# Patient Record
Sex: FEMALE | Race: BLACK OR AFRICAN AMERICAN | Employment: FULL TIME | URBAN - METROPOLITAN AREA
[De-identification: names, ages, dates, MRNs, and addresses within clinical notes are randomized per-mention and may not be internally consistent; named-entity substitution may affect disease eponyms.]

---

## 2021-07-30 ENCOUNTER — OFFICE VISIT (OUTPATIENT)
Dept: CARDIOLOGY CLINIC | Age: 56
End: 2021-07-30
Payer: COMMERCIAL

## 2021-07-30 VITALS
HEIGHT: 64 IN | HEART RATE: 79 BPM | DIASTOLIC BLOOD PRESSURE: 68 MMHG | SYSTOLIC BLOOD PRESSURE: 111 MMHG | WEIGHT: 191 LBS | BODY MASS INDEX: 32.61 KG/M2

## 2021-07-30 DIAGNOSIS — E78.5 HYPERLIPIDEMIA, UNSPECIFIED HYPERLIPIDEMIA TYPE: ICD-10-CM

## 2021-07-30 DIAGNOSIS — R06.02 SHORTNESS OF BREATH: ICD-10-CM

## 2021-07-30 DIAGNOSIS — I20.9 ANGINA PECTORIS SYNDROME (HCC): Primary | ICD-10-CM

## 2021-07-30 DIAGNOSIS — E11.9 TYPE 2 DIABETES MELLITUS WITHOUT COMPLICATION, WITHOUT LONG-TERM CURRENT USE OF INSULIN (HCC): ICD-10-CM

## 2021-07-30 DIAGNOSIS — Z82.49 FAMILY HISTORY OF EARLY CAD: ICD-10-CM

## 2021-07-30 PROCEDURE — 99204 OFFICE O/P NEW MOD 45 MIN: CPT | Performed by: INTERNAL MEDICINE

## 2021-07-30 RX ORDER — ATORVASTATIN CALCIUM 20 MG/1
20 TABLET, FILM COATED ORAL DAILY
Qty: 90 TABLET | Refills: 3 | Status: SHIPPED | OUTPATIENT
Start: 2021-07-30 | End: 2021-07-30 | Stop reason: SDUPTHER

## 2021-07-30 RX ORDER — ASPIRIN 81 MG/1
81 TABLET ORAL DAILY
Qty: 100 TABLET | Refills: 3 | Status: SHIPPED | OUTPATIENT
Start: 2021-07-30 | End: 2021-07-30 | Stop reason: SDUPTHER

## 2021-07-30 RX ORDER — ASPIRIN 81 MG/1
81 TABLET ORAL DAILY
Qty: 100 TABLET | Refills: 3 | Status: SHIPPED | OUTPATIENT
Start: 2021-07-30 | End: 2021-08-06

## 2021-07-30 RX ORDER — METOPROLOL SUCCINATE 50 MG/1
50 TABLET, EXTENDED RELEASE ORAL DAILY
Qty: 90 TABLET | Refills: 3 | Status: SHIPPED | OUTPATIENT
Start: 2021-07-30

## 2021-07-30 RX ORDER — METOPROLOL SUCCINATE 50 MG/1
50 TABLET, EXTENDED RELEASE ORAL DAILY
Qty: 90 TABLET | Refills: 3 | Status: SHIPPED | OUTPATIENT
Start: 2021-07-30 | End: 2021-07-30 | Stop reason: SDUPTHER

## 2021-07-30 RX ORDER — SITAGLIPTIN 100 MG/1
TABLET, FILM COATED ORAL
COMMUNITY
Start: 2021-07-28

## 2021-07-30 RX ORDER — GLIMEPIRIDE 2 MG/1
TABLET ORAL
COMMUNITY
Start: 2021-07-26

## 2021-07-30 RX ORDER — PREDNISONE 50 MG/1
50 TABLET ORAL EVERY 8 HOURS
Qty: 4 TABLET | Refills: 0 | Status: SHIPPED | OUTPATIENT
Start: 2021-07-30 | End: 2021-07-30 | Stop reason: SDUPTHER

## 2021-07-30 RX ORDER — ATORVASTATIN CALCIUM 20 MG/1
20 TABLET, FILM COATED ORAL DAILY
Qty: 90 TABLET | Refills: 3 | Status: SHIPPED | OUTPATIENT
Start: 2021-07-30

## 2021-07-30 RX ORDER — PREDNISONE 50 MG/1
50 TABLET ORAL EVERY 8 HOURS
Qty: 4 TABLET | Refills: 0 | Status: SHIPPED | OUTPATIENT
Start: 2021-07-30 | End: 2021-08-06

## 2021-07-30 NOTE — PROGRESS NOTES
HISTORY OF PRESENT ILLNESS  Carmela Yoo is a 50 y.o. female. July 30, 2021  Patient is in today for new patient evaluation. She is here for evaluation of chest pain. For about a month she has been having episode of substernal pain. This is a pressure-like sensation that lasting anywhere from few minutes to a longer time. Become at rest as well as exertion. Last Friday she had similar episode at rest.  She has episode of shortness of breath that are getting worse also. She was in emergency room where initial evaluation was negative for MI  She has been getting short of breath on exertion these are progressively getting worse. Denies any orthopnea PND. Patient had COVID-19 infection in December 2020. Chest x-ray recently showed no acute infiltrate. Review of Systems   Constitutional: Negative for chills and fever. HENT: Negative for nosebleeds. Eyes: Negative for blurred vision and double vision. Respiratory: Positive for shortness of breath. Negative for cough, hemoptysis, sputum production and wheezing. Cardiovascular: Positive for chest pain. Negative for palpitations, orthopnea, claudication, leg swelling and PND. Gastrointestinal: Negative for abdominal pain, heartburn, nausea and vomiting. Musculoskeletal: Negative for myalgias. Skin: Negative for rash. Neurological: Negative for dizziness, weakness and headaches. Endo/Heme/Allergies: Does not bruise/bleed easily.      Family History   Problem Relation Age of Onset    Heart Attack Mother 39    Heart Surgery Father     Heart Attack Brother 39       Past Medical History:   Diagnosis Date    Anal fissure     Anal pain 8/28/2013    Anemia NEC     Diabetes ([facility] [address]     Hypertension     Nausea & vomiting     has in the past but not recently    Other ill-defined conditions(799.89)     edema       Past Surgical History:   Procedure Laterality Date    HX DILATION AND CURETTAGE      for heavy bleeding    HX HYSTEROSCOPY WITH ENDOMETRIAL ABLATION      HX OTHER SURGICAL      diagnostic laparoscopy 1986    HX OTHER SURGICAL  8/29/13    botox for chronic anal fissure    HX TUBAL LIGATION  1992       Social History     Tobacco Use    Smoking status: Never Smoker    Smokeless tobacco: Never Used   Substance Use Topics    Alcohol use: Yes     Comment: occasionally       Allergies   Allergen Reactions    Peanut Anaphylaxis and Swelling     Facial swelling    Shellfish Derived Anaphylaxis and Swelling     Facial swelling    Iodinated Contrast Media Hives and Rash    Macrobid [Nitrofurantoin Monohyd/M-Cryst] Hives and Rash       Prior to Admission medications    Medication Sig Start Date End Date Taking? Authorizing Provider   Januvia 100 mg tablet TAKE 1 TABLET BY MOUTH ONCE DAILY 7/28/21  Yes Provider, Historical   glimepiride (AMARYL) 2 mg tablet TAKE 1 TABLET BY MOUTH ONCE DAILY 7/26/21  Yes Provider, Historical   metoprolol succinate (TOPROL-XL) 50 mg XL tablet Take 1 Tablet by mouth daily. 7/30/21  Yes Violeta Milner MD   aspirin delayed-release 81 mg tablet Take 1 Tablet by mouth daily. 7/30/21  Yes Violeta Milner MD   atorvastatin (LIPITOR) 20 mg tablet Take 1 Tablet by mouth daily. 7/30/21  Yes Violeta Milner MD   predniSONE (DELTASONE) 50 mg tablet Take 1 Tablet by mouth every eight (8) hours. 7/30/21  Yes Violeta Milner MD   metFORMIN (GLUCOPHAGE) 500 mg tablet Take  by mouth two (2) times daily (with meals). Yes Funmilayo Mendez MD         Visit Vitals  /68   Pulse 79   Ht 5' 4\" (1.626 m)   Wt 86.6 kg (191 lb)   BMI 32.79 kg/m²       Physical Exam  Constitutional:       Appearance: She is well-developed. HENT:      Head: Normocephalic and atraumatic. Eyes:      Conjunctiva/sclera: Conjunctivae normal.   Neck:      Thyroid: No thyromegaly. Vascular: No JVD. Trachea: No tracheal deviation. Cardiovascular:      Rate and Rhythm: Normal rate and regular rhythm.       Chest Wall: PMI is not displaced. Pulses: No decreased pulses. Heart sounds: No murmur heard. No gallop. No S3 sounds. Pulmonary:      Effort: No respiratory distress. Breath sounds: No wheezing or rales. Chest:      Chest wall: No tenderness. Abdominal:      Palpations: Abdomen is soft. Tenderness: There is no abdominal tenderness. Musculoskeletal:      Cervical back: Neck supple. Skin:     General: Skin is warm. Neurological:      Mental Status: She is alert and oriented to person, place, and time. Ms. Solomon Fountain has a reminder for a \"due or due soon\" health maintenance. I have asked that she contact her primary care provider for follow-up on this health maintenance. No flowsheet data found. I have personally reviewed patient's records available from hospital and other providers and incorporated findings in patient care. Provider notes, lab, EKG, x-ray    Assessment         ICD-10-CM ICD-9-CM    1. Angina pectoris syndrome (HCC)  N14.9 292.6 METABOLIC PANEL, BASIC      CBC WITH AUTOMATED DIFF      PROTHROMBIN TIME + INR      DE CATH PLACEMENT & NJX CORONARY ART ANGIO IMG S&I      CASE REQUEST CATH LAB    New onset progressive angina class III. Multiple risk factor including premature atherosclerosis in family   2. Shortness of breath  R06.02 786.05     Possible angina equivalent. Class III   3. Type 2 diabetes mellitus without complication, without long-term current use of insulin (HCC)  E11.9 250.00     Continue treatment lab with PCP   4. Hyperlipidemia, unspecified hyperlipidemia type  L50.9 146.8 METABOLIC PANEL, BASIC      CBC WITH AUTOMATED DIFF      PROTHROMBIN TIME + INR      DE CATH PLACEMENT & NJX CORONARY ART ANGIO IMG S&I    I have initiated statin follow-up lab   5. Family history of early CAD  Z80.55 V14.2     Multiple family member with CAD MI in 45s       7/2021  Seen for new onset chest pain suggestive of crescendo angina. Multiple risk factor.   Add beta-blocker aspirin and statin. Will proceed with cardiac cath. Prednisone and Benadryl given for contrast allergy  THE PATIENT UNDERSTANDS THAT ALTHOUGH RARE, SEVERE  UNEXPECTED COMPLICATIONS CAN OCCUR WITH EACH TYPE OF CARDIAC CATH PROCEDURE. THESE RISKS INCLUDE,  BUT ARE NOT LIMITED TO: ALLERGIC REACTION, INFECTION, BLEEDING, BLOOD VESSEL INJURY,   KIDNEY INJURY FROM X-RAY DYE, PUNCTURE OF THE HEART/LUNGS,   EMERGENT OPEN HEART SURGERY, HEART ATTACK, STROKE, CARDIAC  ARREST OR DEATH OR NEED FOR EMERGENCY CARDIAC BYPASS SURGERY       Medications Discontinued During This Encounter   Medication Reason    metoprolol succinate (TOPROL-XL) 50 mg XL tablet REORDER    aspirin delayed-release 81 mg tablet REORDER    atorvastatin (LIPITOR) 20 mg tablet REORDER    predniSONE (DELTASONE) 50 mg tablet REORDER       Orders Placed This Encounter    METABOLIC PANEL, BASIC     Standing Status:   Future     Standing Expiration Date:   8/29/2021    CBC WITH AUTOMATED DIFF     Standing Status:   Future     Standing Expiration Date:   8/29/2021    PROTHROMBIN TIME + INR     Standing Status:   Future     Standing Expiration Date:   8/29/2021    CT CATH PLACEMENT & NJX CORONARY ART ANGIO IMG S&I    DISCONTD: metoprolol succinate (TOPROL-XL) 50 mg XL tablet     Sig: Take 1 Tablet by mouth daily. Dispense:  90 Tablet     Refill:  3    DISCONTD: aspirin delayed-release 81 mg tablet     Sig: Take 1 Tablet by mouth daily. Dispense:  100 Tablet     Refill:  3    DISCONTD: atorvastatin (LIPITOR) 20 mg tablet     Sig: Take 1 Tablet by mouth daily. Dispense:  90 Tablet     Refill:  3    DISCONTD: predniSONE (DELTASONE) 50 mg tablet     Sig: Take 1 Tablet by mouth every eight (8) hours. Dispense:  4 Tablet     Refill:  0    metoprolol succinate (TOPROL-XL) 50 mg XL tablet     Sig: Take 1 Tablet by mouth daily. Dispense:  90 Tablet     Refill:  3    aspirin delayed-release 81 mg tablet     Sig: Take 1 Tablet by mouth daily. Dispense:  100 Tablet     Refill:  3    atorvastatin (LIPITOR) 20 mg tablet     Sig: Take 1 Tablet by mouth daily. Dispense:  90 Tablet     Refill:  3    predniSONE (DELTASONE) 50 mg tablet     Sig: Take 1 Tablet by mouth every eight (8) hours. Dispense:  4 Tablet     Refill:  0       Follow-up and Dispositions    · Return in about 4 weeks (around 8/27/2021).

## 2021-07-30 NOTE — PATIENT INSTRUCTIONS
For heart catheterization and before that  Start all the medication as soon as you get it  Take baby aspirin 4 tablets a day for 3 days and then cut it down to 1 tablet a day  Start prednisone the day before procedure take 3 doses the day before procedure and one dose on the morning of procedure  Take Benadryl 25 mg along with prednisone starting the day before procedure     Coronary Angiogram with Possible Treatment: Before Your Procedure  What is a coronary angiogram?     A coronary angiogram is a test to look at the blood vessels of your heart. These are called the coronary arteries. You may have this test to see if any of these arteries are narrowed or blocked. The test may also be used to measure the pressure in your heart's chambers. A doctor will put a thin, flexible tube into a blood vessel in your upper leg or groin. This tube is called a catheter. In some cases, the doctor may insert it in a blood vessel near your elbow or wrist.  During the test, the doctor moves the catheter through the blood vessel and into your heart. Then the doctor puts a dye into the catheter. This makes your coronary arteries show up on a screen. Your doctor can see if the arteries are blocked or narrowed. If you have a narrowed or blocked artery, the doctor may do an angioplasty or a coronary stent procedure. In an angioplasty, the doctor puts a catheter with a tiny balloon at the tip into the blocked area and inflates it. The balloon presses the fatty buildup (plaque) against the walls of the artery. This makes more room for blood to flow. In most cases, the doctor then puts a stent in the artery. A stent is a small, expandable tube. It presses against the walls of the artery. The stent is left in the artery to keep it open. This helps blood flow. The catheter is removed from your body. Follow-up care is a key part of your treatment and safety.  Be sure to make and go to all appointments, and call your doctor if you are having problems. It's also a good idea to know your test results and keep a list of the medicines you take. How do you prepare for the procedure? Procedures can be stressful. This information will help you understand what you can expect. And it will help you safely prepare for your procedure. Preparing for the procedure    · Be sure you have someone to take you home. Anesthesia and pain medicine will make it unsafe for you to drive or get home on your own.     · Understand exactly what procedure is planned, along with the risks, benefits, and other options. · Tell your doctor ALL the medicines, vitamins, supplements, and herbal remedies you take. Some may increase the risk of problems during your procedure. Your doctor will tell you if you should stop taking any of them before the procedure and how soon to do it.     · If you take aspirin or some other blood thinner, ask your doctor if you should stop taking it before your procedure. Make sure that you understand exactly what your doctor wants you to do. These medicines increase the risk of bleeding.     · Make sure your doctor and the hospital have a copy of your advance directive. If you don't have one, you may want to prepare one. It lets others know your health care wishes. It's a good thing to have before any type of surgery or procedure. What happens on the day of the procedure? · Follow the instructions exactly about when to stop eating and drinking. If you don't, your procedure may be canceled. If your doctor told you to take your medicines on the day of the procedure, take them with only a sip of water.     · Take a bath or shower before you come in for your procedure. Do not apply lotions, perfumes, deodorants, or nail polish.     · Do not shave the procedure site yourself.     · Take off all jewelry and piercings. And take out contact lenses, if you wear them.    At the hospital or surgery center   · Bring a picture ID.     · You will be kept comfortable and safe by your anesthesia provider. You may get medicine that relaxes you or puts you in a light sleep. The area being worked on will be numb.     · After the procedure, pressure will be applied to the area where the catheter was put into your artery. Then you may have a bandage or a compression device on your groin or arm at the catheter insertion site. This will prevent bleeding.     · Nurses will check your heart rate and blood pressure. The nurse also will check the catheter site for bleeding.     · If the catheter was put in your groin, you will need to lie still and keep your leg straight for several hours. The nurse may put a weighted bag on your leg to help you keep it still.     · If the catheter was put in your arm, you may be able to sit up and get out of bed right away. But you will need to keep your arm still for at least one hour.     · You may be able to go home later the same day, or you may need to stay in the hospital overnight.     · You may have a bruise where the catheter was put in your groin or arm. This is normal and will go away. When should you call your doctor? · You have questions or concerns.     · You don't understand how to prepare for your procedure.     · You become ill before the procedure (such as fever, flu, or a cold).     · You need to reschedule or have changed your mind about having the procedure. Where can you learn more? Go to http://www.gray.com/  Enter V630 in the search box to learn more about \"Coronary Angiogram with Possible Treatment: Before Your Procedure. \"  Current as of: August 31, 2020               Content Version: 12.8  © 0623-6229 [employer]. Care instructions adapted under license by [employer] (which disclaims liability or warranty for this information).  If you have questions about a medical condition or this instruction, always ask your healthcare professional. Cherelle Arreola disclaims any warranty or liability for your use of this information. Coronary Angiogram with Possible Treatment: Before Your Procedure  What is a coronary angiogram?     A coronary angiogram is a test to look at the blood vessels of your heart. These are called the coronary arteries. You may have this test to see if any of these arteries are narrowed or blocked. The test may also be used to measure the pressure in your heart's chambers. A doctor will put a thin, flexible tube into a blood vessel in your upper leg or groin. This tube is called a catheter. In some cases, the doctor may insert it in a blood vessel near your elbow or wrist.  During the test, the doctor moves the catheter through the blood vessel and into your heart. Then the doctor puts a dye into the catheter. This makes your coronary arteries show up on a screen. Your doctor can see if the arteries are blocked or narrowed. If you have a narrowed or blocked artery, the doctor may do an angioplasty or a coronary stent procedure. In an angioplasty, the doctor puts a catheter with a tiny balloon at the tip into the blocked area and inflates it. The balloon presses the fatty buildup (plaque) against the walls of the artery. This makes more room for blood to flow. In most cases, the doctor then puts a stent in the artery. A stent is a small, expandable tube. It presses against the walls of the artery. The stent is left in the artery to keep it open. This helps blood flow. The catheter is removed from your body. Follow-up care is a key part of your treatment and safety. Be sure to make and go to all appointments, and call your doctor if you are having problems. It's also a good idea to know your test results and keep a list of the medicines you take. How do you prepare for the procedure? Procedures can be stressful. This information will help you understand what you can expect. And it will help you safely prepare for your procedure.   Preparing for the procedure    · Be sure you have someone to take you home. Anesthesia and pain medicine will make it unsafe for you to drive or get home on your own.     · Understand exactly what procedure is planned, along with the risks, benefits, and other options. · Tell your doctor ALL the medicines, vitamins, supplements, and herbal remedies you take. Some may increase the risk of problems during your procedure. Your doctor will tell you if you should stop taking any of them before the procedure and how soon to do it.     · If you take aspirin or some other blood thinner, ask your doctor if you should stop taking it before your procedure. Make sure that you understand exactly what your doctor wants you to do. These medicines increase the risk of bleeding.     · Make sure your doctor and the hospital have a copy of your advance directive. If you don't have one, you may want to prepare one. It lets others know your health care wishes. It's a good thing to have before any type of surgery or procedure. What happens on the day of the procedure? · Follow the instructions exactly about when to stop eating and drinking. If you don't, your procedure may be canceled. If your doctor told you to take your medicines on the day of the procedure, take them with only a sip of water.     · Take a bath or shower before you come in for your procedure. Do not apply lotions, perfumes, deodorants, or nail polish.     · Do not shave the procedure site yourself.     · Take off all jewelry and piercings. And take out contact lenses, if you wear them. At the hospital or surgery center   · Bring a picture ID.     · You will be kept comfortable and safe by your anesthesia provider. You may get medicine that relaxes you or puts you in a light sleep. The area being worked on will be numb.     · After the procedure, pressure will be applied to the area where the catheter was put into your artery.  Then you may have a bandage or a compression device on your groin or arm at the catheter insertion site. This will prevent bleeding.     · Nurses will check your heart rate and blood pressure. The nurse also will check the catheter site for bleeding.     · If the catheter was put in your groin, you will need to lie still and keep your leg straight for several hours. The nurse may put a weighted bag on your leg to help you keep it still.     · If the catheter was put in your arm, you may be able to sit up and get out of bed right away. But you will need to keep your arm still for at least one hour.     · You may be able to go home later the same day, or you may need to stay in the hospital overnight.     · You may have a bruise where the catheter was put in your groin or arm. This is normal and will go away. When should you call your doctor? · You have questions or concerns.     · You don't understand how to prepare for your procedure.     · You become ill before the procedure (such as fever, flu, or a cold).     · You need to reschedule or have changed your mind about having the procedure. Where can you learn more? Go to http://www.gray.com/  Enter C330 in the search box to learn more about \"Coronary Angiogram with Possible Treatment: Before Your Procedure. \"  Current as of: August 31, 2020               Content Version: 12.8  © 2006-2021 [employer]. Care instructions adapted under license by [employer] (which disclaims liability or warranty for this information). If you have questions about a medical condition or this instruction, always ask your healthcare professional. [place] [address] any warranty or liability for your use of this information. Instructions    Patients Name:  Sae Wagner    1. [facility]             .       2. Please go to [facility] and ubaldo in the outpatient parking lot that is located around to the back of the hospital and enter through the [facility]. Once you enter through the [facility] check in with the  there. The  will either give you directions or assist you in getting to the cath holding area. 3. You are not to eat anything after midnight before the procedure. Please continue to drink fluids up until 12:00.  (water, juice, black coffee, soft drinks). Do not drink milk or milk products or anything with cream. You may take medications(except for diabetes) with a small sip of water before 6am on the day of the procedure. .    4. If you are diabetic, do not take your insulin/sugar pill the morning of the procedure. 5. MEDICATION INSTRUCTIONS:   Please take your morning medications with the following special instructions:      [x]          Please make sure to take your Blood pressure medication : With just enough water to swallow. [x]          Take your Aspirin and/or Plavix. With just enough water to swallow. []          Stop your Coumadin on   and do not resume it until after the procedure.      []          Take Prednisone 60 mg and Benadryl 25 mg by mouth at Bedtime on  and again on at . This is to prevent you from having an allergic reaction to the dye. 6. We encourage families to wait in the waiting room on the first floor while the procedure is being done. The Doctor will come out and talk with you as soon as the procedure is over. 7. There is the possibility that you may spend the night in the hospital, depending on the results of the procedure. This will be determined after the procedure is done. If angioplasty or stent is planned, you will stay at least one day. 8. If you or your family have any questions, please call our office Monday Friday, 9:00 a. m.4:30 p.m.,  At [phone], and ask to speak to one of the nurses.

## 2021-08-02 ENCOUNTER — TELEPHONE (OUTPATIENT)
Dept: CARDIOLOGY CLINIC | Age: 56
End: 2021-08-02

## 2021-08-02 ENCOUNTER — HOSPITAL ENCOUNTER (OUTPATIENT)
Dept: LAB | Age: 56
Discharge: HOME | End: 2021-08-02
Payer: COMMERCIAL

## 2021-08-02 DIAGNOSIS — I20.9 ANGINA PECTORIS SYNDROME (HCC): ICD-10-CM

## 2021-08-02 DIAGNOSIS — E78.5 HYPERLIPIDEMIA, UNSPECIFIED HYPERLIPIDEMIA TYPE: ICD-10-CM

## 2021-08-02 LAB
ANION GAP SERPL CALC-SCNC: 5 MMOL/L (ref 3–18)
BASOPHILS # BLD: 0 K/UL (ref 0–0.1)
BASOPHILS NFR BLD: 0 % (ref 0–2)
BUN SERPL-MCNC: 12 MG/DL (ref 7–18)
BUN/CREAT SERPL: 13 (ref 12–20)
CALCIUM SERPL-MCNC: 9.1 MG/DL (ref 8.5–10.1)
CHLORIDE SERPL-SCNC: 104 MMOL/L (ref 100–111)
CO2 SERPL-SCNC: 28 MMOL/L (ref 21–32)
CREAT SERPL-MCNC: 0.93 MG/DL (ref 0.6–1.3)
DIFFERENTIAL METHOD BLD: NORMAL
EOSINOPHIL # BLD: 0.1 K/UL (ref 0–0.4)
EOSINOPHIL NFR BLD: 1 % (ref 0–5)
ERYTHROCYTE [DISTWIDTH] IN BLOOD BY AUTOMATED COUNT: 12.4 % (ref 11.6–14.5)
GLUCOSE SERPL-MCNC: 375 MG/DL (ref 74–99)
HCT VFR BLD AUTO: 40.7 % (ref 35–45)
HGB BLD-MCNC: 13 G/DL (ref 12–16)
INR PPP: 1 (ref 0.8–1.2)
LYMPHOCYTES # BLD: 1.5 K/UL (ref 0.9–3.6)
LYMPHOCYTES NFR BLD: 29 % (ref 21–52)
MCH RBC QN AUTO: 27.4 PG (ref 24–34)
MCHC RBC AUTO-ENTMCNC: 31.9 G/DL (ref 31–37)
MCV RBC AUTO: 85.7 FL (ref 74–97)
MONOCYTES # BLD: 0.3 K/UL (ref 0.05–1.2)
MONOCYTES NFR BLD: 6 % (ref 3–10)
NEUTS SEG # BLD: 3.4 K/UL (ref 1.8–8)
NEUTS SEG NFR BLD: 64 % (ref 40–73)
PLATELET # BLD AUTO: 228 K/UL (ref 135–420)
PMV BLD AUTO: 11.3 FL (ref 9.2–11.8)
POTASSIUM SERPL-SCNC: 4.1 MMOL/L (ref 3.5–5.5)
PROTHROMBIN TIME: 13.3 SEC (ref 11.5–15.2)
RBC # BLD AUTO: 4.75 M/UL (ref 4.2–5.3)
SODIUM SERPL-SCNC: 137 MMOL/L (ref 136–145)
WBC # BLD AUTO: 5.3 K/UL (ref 4.6–13.2)

## 2021-08-02 PROCEDURE — 85610 PROTHROMBIN TIME: CPT

## 2021-08-02 PROCEDURE — 85025 COMPLETE CBC W/AUTO DIFF WBC: CPT

## 2021-08-02 PROCEDURE — 80048 BASIC METABOLIC PNL TOTAL CA: CPT

## 2021-08-02 PROCEDURE — 36415 COLL VENOUS BLD VENIPUNCTURE: CPT

## 2021-08-02 NOTE — TELEPHONE ENCOUNTER
----- Message from Edgardo Chavez MD sent at 8/2/2021 12:38 PM EDT -----  Glucose level elevated treatment for PCP otherwise unremarkable

## 2021-08-02 NOTE — TELEPHONE ENCOUNTER
Spoke to patient per  regarding lab/test, lab reviewed Glucose level elevated treatment for PCP otherwise unremarkable. Patient states seen PCP and he changed medications. She voices understanding and acceptance of this advice and will call back if any further questions or concerns.

## 2021-08-06 ENCOUNTER — HOSPITAL ENCOUNTER (OUTPATIENT)
Age: 56
Setting detail: OUTPATIENT SURGERY
Discharge: HOME | End: 2021-08-06
Attending: INTERNAL MEDICINE | Admitting: INTERNAL MEDICINE
Payer: COMMERCIAL

## 2021-08-06 VITALS
WEIGHT: 192 LBS | OXYGEN SATURATION: 97 % | BODY MASS INDEX: 32.78 KG/M2 | SYSTOLIC BLOOD PRESSURE: 107 MMHG | DIASTOLIC BLOOD PRESSURE: 56 MMHG | HEART RATE: 72 BPM | RESPIRATION RATE: 23 BRPM | HEIGHT: 64 IN

## 2021-08-06 DIAGNOSIS — I20.8 ANGINA AT REST (HCC): ICD-10-CM

## 2021-08-06 PROCEDURE — 74011000636 HC RX REV CODE- 636: Performed by: INTERNAL MEDICINE

## 2021-08-06 PROCEDURE — 74011250636 HC RX REV CODE- 250/636: Performed by: INTERNAL MEDICINE

## 2021-08-06 PROCEDURE — C1769 GUIDE WIRE: HCPCS | Performed by: INTERNAL MEDICINE

## 2021-08-06 PROCEDURE — C1894 INTRO/SHEATH, NON-LASER: HCPCS | Performed by: INTERNAL MEDICINE

## 2021-08-06 PROCEDURE — 93458 L HRT ARTERY/VENTRICLE ANGIO: CPT | Performed by: INTERNAL MEDICINE

## 2021-08-06 PROCEDURE — 77030016699 HC CATH ANGI DX INFN1 CARD -A: Performed by: INTERNAL MEDICINE

## 2021-08-06 PROCEDURE — 77030013797 HC KT TRNSDUC PRSSR EDWD -A: Performed by: INTERNAL MEDICINE

## 2021-08-06 PROCEDURE — 74011000250 HC RX REV CODE- 250: Performed by: INTERNAL MEDICINE

## 2021-08-06 PROCEDURE — 77030019569 HC BND COMPR RAD TERU -B: Performed by: INTERNAL MEDICINE

## 2021-08-06 PROCEDURE — 99152 MOD SED SAME PHYS/QHP 5/>YRS: CPT | Performed by: INTERNAL MEDICINE

## 2021-08-06 RX ORDER — LIDOCAINE HYDROCHLORIDE 10 MG/ML
INJECTION, SOLUTION EPIDURAL; INFILTRATION; INTRACAUDAL; PERINEURAL AS NEEDED
Status: DISCONTINUED | OUTPATIENT
Start: 2021-08-06 | End: 2021-08-06 | Stop reason: HOSPADM

## 2021-08-06 RX ORDER — SODIUM CHLORIDE 0.9 % (FLUSH) 0.9 %
5-40 SYRINGE (ML) INJECTION AS NEEDED
Status: CANCELLED | OUTPATIENT
Start: 2021-08-06

## 2021-08-06 RX ORDER — SODIUM CHLORIDE 0.9 % (FLUSH) 0.9 %
5-40 SYRINGE (ML) INJECTION EVERY 8 HOURS
Status: CANCELLED | OUTPATIENT
Start: 2021-08-06

## 2021-08-06 RX ORDER — FENTANYL CITRATE 50 UG/ML
INJECTION, SOLUTION INTRAMUSCULAR; INTRAVENOUS AS NEEDED
Status: DISCONTINUED | OUTPATIENT
Start: 2021-08-06 | End: 2021-08-06 | Stop reason: HOSPADM

## 2021-08-06 RX ORDER — HEPARIN SODIUM 1000 [USP'U]/ML
INJECTION, SOLUTION INTRAVENOUS; SUBCUTANEOUS AS NEEDED
Status: DISCONTINUED | OUTPATIENT
Start: 2021-08-06 | End: 2021-08-06 | Stop reason: HOSPADM

## 2021-08-06 RX ORDER — MIDAZOLAM HYDROCHLORIDE 1 MG/ML
INJECTION, SOLUTION INTRAMUSCULAR; INTRAVENOUS AS NEEDED
Status: DISCONTINUED | OUTPATIENT
Start: 2021-08-06 | End: 2021-08-06 | Stop reason: HOSPADM

## 2021-08-06 NOTE — Clinical Note
Sheath #2: Sheath: inserted. Sheath inserted/placed in the right femoral  artery. Upon evaluation of the common femoral artery stick using fluoroscopy, the access site puncture was within the safe zone.

## 2021-08-06 NOTE — Clinical Note
TRANSFER - IN REPORT:     Verbal report received from: Carmine Fernandez RN. Report consisted of patient's Situation, Background, Assessment and   Recommendations(SBAR). Opportunity for questions and clarification was provided. Assessment completed upon patient's arrival to unit and care assumed. Patient transported with a Cardiac Cath Tech / Patient Care Tech.

## 2021-08-06 NOTE — H&P
H&p update    No new symptoms  Risks, benefits and alternatives of LHC/ptca/stent explained to patient.   All questions answered

## 2021-08-06 NOTE — Clinical Note
TRANSFER - OUT REPORT:     Verbal report given to: Shiv Romero. Report consisted of patient's Situation, Background, Assessment and   Recommendations(SBAR). Opportunity for questions and clarification was provided. Patient transported with a Cardiac Cath Tech / Patient Care Tech. Patient transported to: holding area.

## 2021-08-06 NOTE — Clinical Note
Sheath #2: Sheath: removed. Upon evaluation of the common femoral artery stick using fluoroscopy, the access site puncture was within the safe zone.

## 2021-08-06 NOTE — DISCHARGE INSTRUCTIONS
Patient Education        Left Heart Catheterization: About This Test  What is it? Left heart catheterization is a test to check the left side of your heart. Your doctor might look at the shape of your heart, the motion of your heart, or the blood pressure inside the chambers. Why is this test done? This test gives information about how your heart is working. It can:  · Check blood flow and blood pressure in the chambers of the heart. · Check the pumping action of the heart. · Find out if a heart defect is present and how severe it is. · Find out how well the heart valves work. How is the test done? · You will get medicine to help you relax. · A thin tube called a catheter is put into a blood vessel in the groin or the arm. The doctor moves the catheter through the blood vessel into your heart. · You will get a shot to numb the skin where the catheter goes in. · Dye may be injected into your heart. Your doctor can watch on special monitors as the dye moves in your heart. The dye helps your doctor see blood flow in your heart. · If a heart defect is found, cardiac catheterization sometimes is used to correct it during the test.  · You will stay in a room for at least a few hours to make sure the catheter site starts to heal. You may have a bandage or a compression device on your groin or arm to prevent bleeding. · If the catheter was placed in your groin, you may lie in bed for a few hours. If the catheter was put in your arm, you will need to keep your arm still for at least 1 hour. How long does it take? The test will take about 30 minutes. If a problem is found and the doctor treats it, the test can take a few hours longer. What happens after the test?  · You may or may not need to stay in the hospital overnight. You will get more instructions for what to do at home. · Drink plenty of fluids for several hours after the test.  Follow-up care is a key part of your treatment and safety.  Be sure to make and go to all appointments, and call your doctor if you are having problems. It's also a good idea to know your test results and keep a list of the medicines you take. Where can you learn more? Go to http://www.gray.com/  Enter W306 in the search box to learn more about \"Left Heart Catheterization: About This Test.\"  Current as of: August 31, 2020               Content Version: 12.8  © 2006-2021 [employer]. Care instructions adapted under license by [employer] (which disclaims liability or warranty for this information). If you have questions about a medical condition or this instruction, always ask your healthcare professional. [place] [address] any warranty or liability for your use of this information. DISCHARGE SUMMARY from Nurse    PATIENT INSTRUCTIONS:    After general anesthesia or intravenous sedation, for 24 hours or while taking prescription Narcotics:  · Limit your activities  · Do not drive and operate hazardous machinery  · Do not make important personal or business decisions  · Do  not drink alcoholic beverages  · If you have not urinated within 8 hours after discharge, please contact your surgeon on call. Report the following to your surgeon:  · Excessive pain, swelling, redness or odor of or around the surgical area  · Temperature over 100.5  · Nausea and vomiting lasting longer than 4 hours or if unable to take medications  · Any signs of decreased circulation or nerve impairment to extremity: change in color, persistent  numbness, tingling, coldness or increase pain  · Any questions    What to do at Home:  Recommended activity: Activity as tolerated and no driving for today. If you experience any of the following symptoms Pain not being relieved with over the counter medications , please follow up with Dr. Juanita Wilhelm [phone].     *  Please give a list of your current medications to your Primary Care Provider. *  Please update this list whenever your medications are discontinued, doses are      changed, or new medications (including over-the-counter products) are added. *  Please carry medication information at all times in case of emergency situations. These are general instructions for a healthy lifestyle:    No smoking/ No tobacco products/ Avoid exposure to second hand smoke  Surgeon General's Warning:  Quitting smoking now greatly reduces serious risk to your health. Obesity, smoking, and sedentary lifestyle greatly increases your risk for illness    A healthy diet, regular physical exercise & weight monitoring are important for maintaining a healthy lifestyle    You may be retaining fluid if you have a history of heart failure or if you experience any of the following symptoms:  Weight gain of 3 pounds or more overnight or 5 pounds in a week, increased swelling in our hands or feet or shortness of breath while lying flat in bed. Please call your doctor as soon as you notice any of these symptoms; do not wait until your next office visit. The discharge information has been reviewed with the patient. The patient verbalized understanding. Discharge medications reviewed with the patient and appropriate educational materials and side effects teaching were provided.   ___________________________________________________________________________________________________________________________________

## 2021-08-06 NOTE — Clinical Note
Contrast Dose Calculator:   Patient's age: 50.   Patient's sex: Female. Patient weight (kg) = 87. Creatinine level (mg/dL) = 0.93. Creatinine clearance (mL/min): 96.08. Max Contrast dose per Creatinine Cl calculator = 216.18 mL.

## 2021-09-14 ENCOUNTER — OFFICE VISIT (OUTPATIENT)
Dept: CARDIOLOGY CLINIC | Age: 56
End: 2021-09-14
Payer: COMMERCIAL

## 2021-09-14 VITALS
BODY MASS INDEX: 33.29 KG/M2 | WEIGHT: 195 LBS | SYSTOLIC BLOOD PRESSURE: 138 MMHG | HEART RATE: 71 BPM | HEIGHT: 64 IN | DIASTOLIC BLOOD PRESSURE: 83 MMHG

## 2021-09-14 DIAGNOSIS — E11.9 TYPE 2 DIABETES MELLITUS WITHOUT COMPLICATION, WITHOUT LONG-TERM CURRENT USE OF INSULIN (HCC): ICD-10-CM

## 2021-09-14 DIAGNOSIS — E78.5 HYPERLIPIDEMIA, UNSPECIFIED HYPERLIPIDEMIA TYPE: ICD-10-CM

## 2021-09-14 DIAGNOSIS — I20.8 ANGINA AT REST (HCC): Primary | ICD-10-CM

## 2021-09-14 PROCEDURE — 99213 OFFICE O/P EST LOW 20 MIN: CPT | Performed by: INTERNAL MEDICINE

## 2021-09-14 NOTE — PROGRESS NOTES
1. Have you been to the ER, urgent care clinic since your last visit? Hospitalized since your last visit?     no  2. Have you seen or consulted any other health care providers outside of the [address] [place] [address] since your last visit? Include any pap smears or colon screening.       No

## 2021-09-14 NOTE — PROGRESS NOTES
HISTORY OF PRESENT ILLNESS  Isabelle Chavez is a 49 y.o. female. July 30, 2021  Patient is in today for new patient evaluation. She is here for evaluation of chest pain. For about a month she has been having episode of substernal pain. This is a pressure-like sensation that lasting anywhere from few minutes to a longer time. Become at rest as well as exertion. Last Friday she had similar episode at rest.  She has episode of shortness of breath that are getting worse also. She was in emergency room where initial evaluation was negative for MI  She has been getting short of breath on exertion these are progressively getting worse. Denies any orthopnea PND. Patient had COVID-19 infection in December 2020. Chest x-ray recently showed no acute infiltrate. Review of Systems   Constitutional: Negative for chills and fever. HENT: Negative for nosebleeds. Eyes: Negative for blurred vision and double vision. Respiratory: Positive for shortness of breath. Negative for cough, hemoptysis, sputum production and wheezing. Cardiovascular: Positive for chest pain. Negative for palpitations, orthopnea, claudication, leg swelling and PND. Gastrointestinal: Negative for abdominal pain, heartburn, nausea and vomiting. Musculoskeletal: Negative for myalgias. Skin: Negative for rash. Neurological: Negative for dizziness, weakness and headaches. Endo/Heme/Allergies: Does not bruise/bleed easily.      Family History   Problem Relation Age of Onset    Heart Attack Mother 39    Heart Surgery Father     Heart Attack Brother 39       Past Medical History:   Diagnosis Date    Anal fissure     Anal pain 8/28/2013    Anemia NEC     Diabetes ([address]     Hypertension     Nausea & vomiting     has in the past but not recently    Other ill-defined conditions(799.89)     edema       Past Surgical History:   Procedure Laterality Date    HX DILATION AND CURETTAGE      for heavy bleeding    HX HYSTEROSCOPY WITH ENDOMETRIAL ABLATION      HX OTHER SURGICAL      diagnostic laparoscopy 1986    HX OTHER SURGICAL  8/29/13    botox for chronic anal fissure    HX TUBAL LIGATION  1992       Social History     Tobacco Use    Smoking status: Never Smoker    Smokeless tobacco: Never Used   Substance Use Topics    Alcohol use: Yes     Comment: occasionally       Allergies   Allergen Reactions    Peanut Anaphylaxis and Swelling     Facial swelling    Shellfish Derived Anaphylaxis and Swelling     Facial swelling    Trulicity [Dulaglutide] Hives    Iodinated Contrast Media Hives and Rash    Macrobid [Nitrofurantoin Monohyd/M-Cryst] Hives and Rash       Prior to Admission medications    Medication Sig Start Date End Date Taking? Authorizing Provider   Januvia 100 mg tablet TAKE 1 TABLET BY MOUTH ONCE DAILY 7/28/21  Yes Provider, Historical   glimepiride (AMARYL) 2 mg tablet TAKE 1 TABLET BY MOUTH ONCE DAILY 7/26/21  Yes Provider, Historical   metoprolol succinate (TOPROL-XL) 50 mg XL tablet Take 1 Tablet by mouth daily. 7/30/21  Yes Jorge Vasquez MD   atorvastatin (LIPITOR) 20 mg tablet Take 1 Tablet by mouth daily. 7/30/21  Yes Jorge Vasquez MD   metFORMIN (GLUCOPHAGE) 500 mg tablet Take  by mouth two (2) times daily (with meals). Yes Other, MD Funmilayo         Visit Vitals  /83   Pulse 71   Ht 5' 4\" (1.626 m)   Wt 88.5 kg (195 lb)   BMI 33.47 kg/m²       Physical Exam  Constitutional:       Appearance: She is well-developed. HENT:      Head: Normocephalic and atraumatic. Eyes:      Conjunctiva/sclera: Conjunctivae normal.   Neck:      Thyroid: No thyromegaly. Vascular: No JVD. Trachea: No tracheal deviation. Cardiovascular:      Rate and Rhythm: Normal rate and regular rhythm. Chest Wall: PMI is not displaced. Pulses: No decreased pulses. Heart sounds: No murmur heard. No gallop. No S3 sounds. Pulmonary:      Effort: No respiratory distress. Breath sounds:  No wheezing or rales. Chest:      Chest wall: No tenderness. Abdominal:      Palpations: Abdomen is soft. Tenderness: There is no abdominal tenderness. Musculoskeletal:      Cervical back: Neck supple. Skin:     General: Skin is warm. Neurological:      Mental Status: She is alert and oriented to person, place, and time. Ms. Mick Barnes has a reminder for a \"due or due soon\" health maintenance. I have asked that she contact her primary care provider for follow-up on this health maintenance. No flowsheet data found. I have personally reviewed patient's records available from hospital and other providers and incorporated findings in patient care. Provider notes, lab, EKG, x-ray  Complications    Complications documented before study signed (8/9/2021 17:97 AM)     No complications were associated with this study. Documented by Jennifer Key MD - 8/6/2021  8:49 PM      Coronary Findings    Diagnostic  Dominance: Right  Left Main   The vessel was visualized by angiography. The vessel is angiographically normal.   Left Anterior Descending   The vessel was visualized by angiography. The vessel is angiographically normal.   Left Circumflex   The vessel was visualized by angiography. The vessel is angiographically normal.   Right Coronary Artery   The vessel was visualized by angiography. The vessel is angiographically normal.       Assessment         ICD-10-CM ICD-9-CM    1. Angina at rest [facility])  I20.8 413.9     Stable. Likely related to stress. No significant CAD. Medical management   2. Type 2 diabetes mellitus without complication, without long-term current use of insulin ([facility])  E11.9 250.00     Continue current treatment lab with PCP   3. Hyperlipidemia, unspecified hyperlipidemia type  E78.5 272.4     Continue treatment lab with PCP       7/2021  Seen for new onset chest pain suggestive of crescendo angina. Multiple risk factor. Add beta-blocker aspirin and statin.   Will proceed with cardiac cath.  Prednisone and Benadryl given for contrast allergy  9/2021  Angina stable likely from stress no significant CAD. Beta-blocker can be stopped as her pressure at home remains normal    There are no discontinued medications. No orders of the defined types were placed in this encounter. Follow-up and Dispositions    · Return in about 1 year (around 9/14/2022).

## 2024-03-25 ENCOUNTER — TELEPHONE (OUTPATIENT)
Age: 59
End: 2024-03-25

## 2024-03-25 NOTE — TELEPHONE ENCOUNTER
I called and spoke to the pt. The pt was identified using 2 pt identifiers. She was offered an appt with Dr. Adamson on 03/28/24 at 1320. The pt was informed that this would be at the [place] office location on [address] [facility]. She verbalized understanding and is aware of the location. The pt states that this location would be fine and accepted the appt. She was given the physical address to the office. Nothing further needed at this time.

## 2024-03-25 NOTE — TELEPHONE ENCOUNTER
New Patient called and stated that her feet are really cold and her PCP advised her to call and see if she can be seen sooner than 04/18.     I advised her the next available isn't until May but put her on the and she says she will reach back out to her PCP and I advised her that I'll get a message back.    Please advise if she can be seen sooner.     Patient can be reached at [phone].

## 2024-03-27 NOTE — PROGRESS NOTES
[facility]  [address], [address]  [place] [address]  Phone: [phone]  Fax: [phone]     NEW PATIENT  Pt's YOB: 1965    ASSESSMENT   Rosanna was seen today for new patient and lower back pain.    Diagnoses and all orders for this visit:    Lumbar spine pain  -     AMB POC XRAY, SPINE, LUMBOSACRAL; 4+  -     MRI LUMBAR SPINE WO CONTRAST; Future    Lumbar radiculitis  -     MRI LUMBAR SPINE WO CONTRAST; Future  -     gabapentin (NEURONTIN) 300 MG capsule; Taper up to 2 capsules bid for neuropathic symptoms as directed    Muscle spasm  -     MRI LUMBAR SPINE WO CONTRAST; Future  -     diazePAM (VALIUM) 5 MG tablet; Take 1 at night as needed for muscle spasm    Left foot pain  -     MRI LUMBAR SPINE WO CONTRAST; Future         IMPRESSION AND PLAN:  Rosanna Diaz is a 58 y.o. RHD female with history of lumbar pain with radicular symptoms and left foot pain. Patient is taking metformin 500 mg, glimepiride 2 mg, insulin, and atorvastatin 20 mg. She has previously had PT.    Pt was given information on Complex Regional Pain Syndrome  Discussed treatment options with the patient including advanced imaging and medications.   Lumbar 4V x-rays were obtained in clinic  Lumbar MRI was ordered to assess for stenosis, inflammation and impingement  Ms. Diaz has a reminder for a \"due or due soon\" health maintenance. I have asked that she contact her primary care provider, Saul Lucio MD, for follow-up on this health maintenance.   demonstrated consistency with prescribing.   Patient was prescribed Gabapentin 300 mg  tapering up to 2 pills BID  Pending results of MRI, also consideration for CRPS for left foot / leg pain.    Return in about 4 weeks (around 4/25/2024) for Medication follow up, Diagnostic follow up.      HISTORY OF PRESENT ILLNESS:  Rosanna Diaz is a 58 y.o. right hand dominant female with history of lumbar pain. Pt presents

## 2024-03-28 ENCOUNTER — OFFICE VISIT (OUTPATIENT)
Age: 59
End: 2024-03-28
Payer: COMMERCIAL

## 2024-03-28 VITALS
OXYGEN SATURATION: 93 % | BODY MASS INDEX: 27.31 KG/M2 | TEMPERATURE: 98.1 F | WEIGHT: 160 LBS | SYSTOLIC BLOOD PRESSURE: 113 MMHG | HEIGHT: 64 IN | HEART RATE: 84 BPM | DIASTOLIC BLOOD PRESSURE: 79 MMHG

## 2024-03-28 DIAGNOSIS — M54.50 LUMBAR SPINE PAIN: Primary | ICD-10-CM

## 2024-03-28 DIAGNOSIS — M54.16 LUMBAR RADICULITIS: ICD-10-CM

## 2024-03-28 DIAGNOSIS — M62.838 MUSCLE SPASM: ICD-10-CM

## 2024-03-28 DIAGNOSIS — M79.672 LEFT FOOT PAIN: ICD-10-CM

## 2024-03-28 PROCEDURE — 99204 OFFICE O/P NEW MOD 45 MIN: CPT | Performed by: PHYSICAL MEDICINE & REHABILITATION

## 2024-03-28 PROCEDURE — 72110 X-RAY EXAM L-2 SPINE 4/>VWS: CPT | Performed by: PHYSICAL MEDICINE & REHABILITATION

## 2024-03-28 RX ORDER — GLUCOSAMINE HCL 500 MG
TABLET ORAL
COMMUNITY

## 2024-03-28 RX ORDER — GABAPENTIN 300 MG/1
CAPSULE ORAL
Qty: 120 CAPSULE | Refills: 1 | Status: SHIPPED | OUTPATIENT
Start: 2024-03-28 | End: 2024-05-21

## 2024-03-28 RX ORDER — DIAZEPAM 5 MG/1
TABLET ORAL
Qty: 20 TABLET | Refills: 0 | Status: SHIPPED | OUTPATIENT
Start: 2024-03-28 | End: 2024-04-23

## 2024-04-04 ENCOUNTER — TELEPHONE (OUTPATIENT)
Age: 59
End: 2024-04-04

## 2024-04-04 DIAGNOSIS — F40.240 CLAUSTROPHOBIA: Primary | ICD-10-CM

## 2024-04-04 RX ORDER — DIAZEPAM 5 MG/1
TABLET ORAL
Qty: 2 TABLET | Refills: 0 | Status: SHIPPED | OUTPATIENT
Start: 2024-04-04 | End: 2024-04-17

## 2024-04-04 NOTE — TELEPHONE ENCOUNTER
Patient called and said that she is having the Mri of the Lumbar Spine that  ordered on 4/8/24 at [facility].    Patient said that she is Claustrophobic and is wondering if she could get something that will help her through the Mri.    [facility] on [address]   Tel. [phone]    Patient tel. [phone].

## 2024-04-05 NOTE — TELEPHONE ENCOUNTER
Pt identified using 2 identifiers. Informed pt that rx was at pharmacy. Also informed her how to take it and reminded her she will need a .

## 2024-04-08 ENCOUNTER — HOSPITAL ENCOUNTER (OUTPATIENT)
Facility: HOSPITAL | Age: 59
Discharge: HOME | End: 2024-04-11
Attending: PHYSICAL MEDICINE & REHABILITATION
Payer: COMMERCIAL

## 2024-04-08 DIAGNOSIS — M79.672 LEFT FOOT PAIN: ICD-10-CM

## 2024-04-08 DIAGNOSIS — M54.16 LUMBAR RADICULITIS: ICD-10-CM

## 2024-04-08 DIAGNOSIS — M54.50 LUMBAR SPINE PAIN: ICD-10-CM

## 2024-04-08 DIAGNOSIS — M62.838 MUSCLE SPASM: ICD-10-CM

## 2024-04-08 PROCEDURE — 72148 MRI LUMBAR SPINE W/O DYE: CPT

## 2024-05-09 ENCOUNTER — OFFICE VISIT (OUTPATIENT)
Age: 59
End: 2024-05-09
Payer: COMMERCIAL

## 2024-05-09 VITALS
OXYGEN SATURATION: 100 % | HEIGHT: 64 IN | WEIGHT: 155.8 LBS | TEMPERATURE: 98.2 F | BODY MASS INDEX: 26.6 KG/M2 | HEART RATE: 113 BPM

## 2024-05-09 DIAGNOSIS — R20.8 ALLODYNIA: ICD-10-CM

## 2024-05-09 DIAGNOSIS — M79.605 LEFT LEG PAIN: ICD-10-CM

## 2024-05-09 DIAGNOSIS — R09.89 DECREASED PULSE: ICD-10-CM

## 2024-05-09 DIAGNOSIS — M54.16 LUMBAR RADICULITIS: Primary | ICD-10-CM

## 2024-05-09 PROCEDURE — 99214 OFFICE O/P EST MOD 30 MIN: CPT | Performed by: PHYSICAL MEDICINE & REHABILITATION

## 2024-05-09 PROCEDURE — 93922 UPR/L XTREMITY ART 2 LEVELS: CPT | Performed by: PHYSICAL MEDICINE & REHABILITATION

## 2024-05-09 RX ORDER — DULOXETIN HYDROCHLORIDE 30 MG/1
30 CAPSULE, DELAYED RELEASE ORAL DAILY
Qty: 30 CAPSULE | Refills: 1 | Status: SHIPPED | OUTPATIENT
Start: 2024-05-09

## 2024-05-09 RX ORDER — PIOGLITAZONEHYDROCHLORIDE 15 MG/1
15 TABLET ORAL DAILY
COMMUNITY
Start: 2024-03-22

## 2024-05-09 RX ORDER — GABAPENTIN 300 MG/1
CAPSULE ORAL
Qty: 120 CAPSULE | Refills: 2 | Status: SHIPPED | OUTPATIENT
Start: 2024-05-09 | End: 2024-07-02

## 2024-05-09 NOTE — PROGRESS NOTES
lb 12.8 oz)   SpO2 100%   BMI 26.74 kg/m²     Constitutional: Awake, alert, and in no acute distress.  Neurological:1+ symmetrical DTRs in the upper extremities. 1+ symmetrical DTRs in the lower extremities. Sensation to light touch is intact. Negative Herrera's sign bilaterally.  Skin: cool, dry, and intact left foot; decreased pulse;    Musculoskeletal: No pain with extension, axial loading, or forward flexion. No pain with internal or external rotation of her hips. Negative straight leg raise bilaterally. Heel walk, toe walk, and single leg stance not tested due to severe left foot pain.       Biceps  Triceps Deltoids Wrist Ext Wrist Flex Hand Intrin   Right +4/5 +4/5 +4/5 +4/5 +4/5 +4/5   Left +4/5 +4/5 +4/5 +4/5 +4/5 +4/5      Hip Flex  Quads Hamstrings Ankle DF EHL Ankle PF   Right +4/5 +4/5 +4/5 +4/5 +4/5 +4/5   Left +4/5 +4/5 +4/5 +4/5 +4/5 +4/5     IMAGING:  Lumbar MRI from 04/06/24 was personally reviewed with the patient and demonstrated:  NARRATIVE & IMPRESSION:  MRI LUMBAR SPINE WO CONTRAST: 4/8/2024 2:23 PM  CLINICAL INFORMATION: Low back pain, unspecified; Radiculopathy, lumbar region; Other muscle spasm; Pain in left foot.  COMPARISON: None.  TECHNIQUE: Multiplanar MR images of the lumbar spine obtained including T1 and T2-weighted  sequences.     FINDINGS: For discussion purposes, the most superior axial T2-weighted image defines the .  Vertebral body height and alignment: Normal. No evidence of acute fracture.  Bone marrow signal: Normal.  Conus/filum: Normal in appearance and terminates at an appropriate level.   Intervertebral disc height: Normal.  Paraspinal tissues: The paraspinous soft tissues are within normal limits.     Specific segmental anatomy is as follows:  T12-L1: Central canal and neural foramina appear patent.  L1-2: Central canal and neural foramina appear patent.  L2-3: Central canal and neural foramina appear patent.  L3-4: Mild broad-based disc bulge. Central canal and neural

## 2024-06-13 ENCOUNTER — OFFICE VISIT (OUTPATIENT)
Age: 59
End: 2024-06-13

## 2024-06-13 VITALS
HEART RATE: 93 BPM | BODY MASS INDEX: 27.66 KG/M2 | WEIGHT: 162 LBS | HEIGHT: 64 IN | OXYGEN SATURATION: 99 % | TEMPERATURE: 98 F

## 2024-06-13 DIAGNOSIS — R20.8 ALLODYNIA: Primary | ICD-10-CM

## 2024-06-13 DIAGNOSIS — M79.672 LEFT FOOT PAIN: ICD-10-CM

## 2024-06-13 DIAGNOSIS — M54.16 LUMBAR RADICULITIS: ICD-10-CM

## 2024-06-13 DIAGNOSIS — M62.838 MUSCLE SPASM: ICD-10-CM

## 2024-06-13 DIAGNOSIS — M79.605 LEFT LEG PAIN: ICD-10-CM

## 2024-06-13 PROCEDURE — 99213 OFFICE O/P EST LOW 20 MIN: CPT | Performed by: PHYSICAL MEDICINE & REHABILITATION

## 2024-06-13 RX ORDER — GABAPENTIN 300 MG/1
CAPSULE ORAL
Qty: 120 CAPSULE | Refills: 5 | Status: SHIPPED | OUTPATIENT
Start: 2024-06-13 | End: 2024-08-06

## 2024-06-13 RX ORDER — DULOXETIN HYDROCHLORIDE 30 MG/1
30 CAPSULE, DELAYED RELEASE ORAL DAILY
Qty: 30 CAPSULE | Refills: 5 | Status: SHIPPED | OUTPATIENT
Start: 2024-06-13

## 2024-06-13 NOTE — PROGRESS NOTES
[facility]  [address], [address]  [place] [address]  Phone: [phone]  Fax: [phone]    Pt's YOB: 1965    ASSESSMENT   Rosanna was seen today for back pain.    Diagnoses and all orders for this visit:    Allodynia    Lumbar radiculitis  -     DULoxetine (CYMBALTA) 30 MG extended release capsule; Take 1 capsule by mouth daily For neuropathic symptoms  -     gabapentin (NEURONTIN) 300 MG capsule; Take 2 capsules bid for neuropathic symptoms as directed    Left leg pain    Muscle spasm    Left foot pain         IMPRESSION AND PLAN:  Patient is a 58-year-old female with a history of left leg and left foot pain and inciting injury.  She has responded well to the use of gabapentin and duloxetine 30 mg.  She notes that her left foot has improved to the point where she is now able to wear shoe and would like to continue with her medication.    1) Pt was given information on complex regional pain syndrome  2) Pt given refill for Cymbalta 30 mg daily for neuropathic symptoms  3) Pt given refill for gabapentin 300 mg - 2 po bid for neuropathic symptoms  4) Desensitization therapy discussed with pt -- she will begin this at home as her insurance was cancelled   4) Ms. Diaz has a reminder for a \"due or due soon\" health maintenance. I have asked that she contact her primary care provider, Saul Lucio MD, for follow-up on this health maintenance.  5)  demonstrated consistency with prescribing.     Return in about 3 months (around 9/13/2024) for Medication follow up.        HISTORY OF PRESENT ILLNESS:  Rosanna Diaz is a 58 y.o.  female with history of left leg and foot pain and presents to the office today for medication follow up.  Patient notes that she did not get the vascular study that was ordered at her previous office visit as her insurance was canceled through her company.  The company is currently in the process of deciding

## 2024-09-26 ENCOUNTER — OFFICE VISIT (OUTPATIENT)
Age: 59
End: 2024-09-26

## 2024-09-26 VITALS
BODY MASS INDEX: 30.05 KG/M2 | HEART RATE: 69 BPM | WEIGHT: 176 LBS | OXYGEN SATURATION: 97 % | HEIGHT: 64 IN | TEMPERATURE: 98 F

## 2024-09-26 DIAGNOSIS — R20.8 ALLODYNIA: ICD-10-CM

## 2024-09-26 DIAGNOSIS — M79.2 NEURITIS: ICD-10-CM

## 2024-09-26 DIAGNOSIS — M54.16 LUMBAR RADICULITIS: ICD-10-CM

## 2024-09-26 DIAGNOSIS — G90.522 COMPLEX REGIONAL PAIN SYNDROME I OF LEFT LOWER LIMB: Primary | ICD-10-CM

## 2024-09-26 RX ORDER — METHYLPREDNISOLONE 4 MG
TABLET, DOSE PACK ORAL
Qty: 1 KIT | Refills: 0 | Status: SHIPPED | OUTPATIENT
Start: 2024-09-26 | End: 2024-10-02

## 2024-09-26 RX ORDER — DULOXETIN HYDROCHLORIDE 30 MG/1
30 CAPSULE, DELAYED RELEASE ORAL DAILY
Qty: 30 CAPSULE | Refills: 5 | Status: SHIPPED | OUTPATIENT
Start: 2024-09-26

## 2025-01-22 NOTE — PROGRESS NOTES
[facility]  [address], [address]  [place] [address]  Phone: [phone]  Fax: [phone]    Patient's YOB: 1965    ASSESSMENT   Rosanna was seen today for follow-up.    Diagnoses and all orders for this visit:    Complex regional pain syndrome i of left lower limb    Lumbar radiculitis  -     gabapentin (NEURONTIN) 300 MG capsule; Take 2 capsules bid for neuropathic symptoms as directed  -     DULoxetine (CYMBALTA) 30 MG extended release capsule; Take 1 capsule by mouth daily For neuropathic symptoms    Neuritis  -     DULoxetine (CYMBALTA) 30 MG extended release capsule; Take 1 capsule by mouth daily For neuropathic symptoms    Allodynia    Right foot pain         IMPRESSION AND PLAN:  Rosanna Diaz is a 59 y.o. female with history of lumbar pain with radicular symptoms and neuropathy pain. Pt complains of lumbar and LLE pain. Since last visit, pt feels her symptoms have improved. Patient is currently taking Cymbalta 30mg QD and Gabapentin 300mg x 2 BID for neuropathic symptoms. Patient utilizes Vitamin D supplements.       Diabetic foot care discussed; pt does wear shoes in her home.   Patient was educated on the benefits of intermittent fasting.  Discussed treatment options with the patient including Voltaren 1% Gel.   Discussed tapering Gabapentin to 300mg BID to assess benefits.   Rf Gabapentin 300mg x 2 BID for neuropathic symptoms.   Refill for Cymbalta 30 mg daily for neuropathic symptoms  Ms. Diaz has a reminder for a \"due or due soon\" health maintenance. I have asked that she contact her primary care provider, Saul Lucio MD, for follow-up on this health maintenance.   demonstrated consistency with prescribing.     Return in about 6 months (around 7/23/2025) for Medication follow up.      HISTORY OF PRESENT ILLNESS:  Rosanna Diaz is a 59 y.o. RHD female who presents to the office today for a medication follow

## 2025-01-23 ENCOUNTER — OFFICE VISIT (OUTPATIENT)
Age: 60
End: 2025-01-23

## 2025-01-23 VITALS
WEIGHT: 174 LBS | OXYGEN SATURATION: 98 % | HEIGHT: 64 IN | TEMPERATURE: 98 F | BODY MASS INDEX: 29.71 KG/M2 | HEART RATE: 116 BPM

## 2025-01-23 DIAGNOSIS — M79.671 RIGHT FOOT PAIN: ICD-10-CM

## 2025-01-23 DIAGNOSIS — R20.8 ALLODYNIA: ICD-10-CM

## 2025-01-23 DIAGNOSIS — M79.2 NEURITIS: ICD-10-CM

## 2025-01-23 DIAGNOSIS — M54.16 LUMBAR RADICULITIS: ICD-10-CM

## 2025-01-23 DIAGNOSIS — G90.522 COMPLEX REGIONAL PAIN SYNDROME I OF LEFT LOWER LIMB: Primary | ICD-10-CM

## 2025-01-23 PROCEDURE — 99213 OFFICE O/P EST LOW 20 MIN: CPT | Performed by: PHYSICAL MEDICINE & REHABILITATION

## 2025-01-23 RX ORDER — DULOXETIN HYDROCHLORIDE 30 MG/1
30 CAPSULE, DELAYED RELEASE ORAL DAILY
Qty: 30 CAPSULE | Refills: 5 | Status: SHIPPED | OUTPATIENT
Start: 2025-01-23

## 2025-01-23 RX ORDER — GABAPENTIN 300 MG/1
CAPSULE ORAL
Qty: 120 CAPSULE | Refills: 5 | Status: SHIPPED | OUTPATIENT
Start: 2025-01-23 | End: 2025-03-18

## 2025-07-15 NOTE — PROGRESS NOTES
Iodinated Contrast Media Hives and Rash    Nitrofurantoin Hives and Rash         REVIEW OF SYSTEMS    Constitutional: Negative for fever, chills, or weight change.   Respiratory: Negative for cough or shortness of breath.     Cardiovascular: Negative for chest pain or palpitations.  Gastrointestinal: Negative for acid reflux, change in bowel habits, or constipation.  Genitourinary: Negative for dysuria and flank pain.   Musculoskeletal: Positive for lumbar pain.  Skin: Negative for rash.   Neurological: Negative for headaches, dizziness, or numbness.  Endo/Heme/Allergies: Negative for increased bruising.   Psychiatric/Behavioral: Negative for difficulty with sleep.    As per HPI    PHYSICAL EXAMINATION  Pulse 66   Temp 97.9 °F (36.6 °C) (Skin)   Ht 1.626 m (5' 4\")   Wt 81.4 kg (179 lb 6.4 oz)   SpO2 99%   BMI 30.79 kg/m²     Constitutional: Awake, alert, and in no acute distress.  Neurological: 1+ symmetrical DTRs in the upper extremities. 1+ symmetrical DTRs in the lower extremities. Sensation to light touch is intact. Negative Herrera's sign bilaterally.  Skin: warm, dry, and intact.   Musculoskeletal:  No pain with extension, axial loading, or forward flexion. No pain with internal or external rotation of her hips. Negative straight leg raise bilaterally.      Biceps  Triceps Deltoids Wrist Ext Wrist Flex Hand Intrin   Right +4/5 +4/5 +4/5 +4/5 +4/5 +4/5   Left +4/5 +4/5 +4/5 +4/5 +4/5 +4/5      Hip Flex  Quads Hamstrings Ankle DF EHL Ankle PF   Right +4/5 +4/5 +4/5 +4/5 +4/5 +4/5   Left +4/5 +4/5 +4/5 +4/5 +4/5 +4/5     IMAGING:    Lumbar MRI from 04/06/24 was personally reviewed with the patient and demonstrated:    FINDINGS: For discussion purposes, the most superior axial T2-weighted image defines the .  Vertebral body height and alignment: Normal. No evidence of acute fracture.  Bone marrow signal: Normal.  Conus/filum: Normal in appearance and terminates at an appropriate level.   Intervertebral disc

## 2025-07-24 ENCOUNTER — OFFICE VISIT (OUTPATIENT)
Age: 60
End: 2025-07-24

## 2025-07-24 VITALS
WEIGHT: 179.4 LBS | BODY MASS INDEX: 30.63 KG/M2 | HEART RATE: 66 BPM | TEMPERATURE: 97.9 F | HEIGHT: 64 IN | OXYGEN SATURATION: 99 %

## 2025-07-24 DIAGNOSIS — M54.16 LUMBAR RADICULITIS: Primary | ICD-10-CM

## 2025-07-24 DIAGNOSIS — M79.2 NEURITIS: ICD-10-CM

## 2025-07-24 DIAGNOSIS — R20.8 ALLODYNIA: ICD-10-CM

## 2025-07-24 DIAGNOSIS — G90.522 COMPLEX REGIONAL PAIN SYNDROME I OF LEFT LOWER LIMB: ICD-10-CM

## 2025-07-24 PROCEDURE — 99213 OFFICE O/P EST LOW 20 MIN: CPT | Performed by: PHYSICAL MEDICINE & REHABILITATION

## 2025-07-24 RX ORDER — GABAPENTIN 300 MG/1
CAPSULE ORAL
Qty: 120 CAPSULE | Refills: 5 | Status: SHIPPED | OUTPATIENT
Start: 2025-07-24 | End: 2025-09-16

## 2025-07-24 RX ORDER — DULOXETIN HYDROCHLORIDE 30 MG/1
30 CAPSULE, DELAYED RELEASE ORAL DAILY
Qty: 30 CAPSULE | Refills: 5 | Status: SHIPPED | OUTPATIENT
Start: 2025-07-24

## (undated) DEVICE — INTRODUCER SHTH 5FR CANN L11CM DIL TIP 25MM GRY TUNGSTEN

## (undated) DEVICE — GLIDESHEATH SLENDER STAINLESS STEEL KIT: Brand: GLIDESHEATH SLENDER

## (undated) DEVICE — CATHETER ANGIO NTR 0.045 INX5 FRX100 CM THRULUMEN EXPO

## (undated) DEVICE — PROCEDURE KIT FLUID MGMT 10 FR CUST MAINFOLD

## (undated) DEVICE — DRAPE,ANGIO,BRACH,STERILE,38X44: Brand: MEDLINE

## (undated) DEVICE — GUIDEWIRE VASC L260CM DIA0035IN TIP L3MM PTFE J STD TAPR FIX

## (undated) DEVICE — PACK PROCEDURE SURG VASC CATH 161 MMC LF

## (undated) DEVICE — PRESSURE MONITORING SET: Brand: TRUWAVE

## (undated) DEVICE — DEVICE CLSR 5FR BIOABSRB FULL INTEGR RAP HEMSTAS FOR FEM

## (undated) DEVICE — SET FLD ADMIN 3 W STPCOCK FIX FEM L BOR 1IN

## (undated) DEVICE — CATHETER ANGIO 5FR L100CM GRY S STL NYL JL3.5 3 SEG BRAID L

## (undated) DEVICE — TR BAND RADIAL ARTERY COMPRESSION DEVICE: Brand: TR BAND